# Patient Record
Sex: MALE | Race: WHITE | Employment: UNEMPLOYED | URBAN - METROPOLITAN AREA
[De-identification: names, ages, dates, MRNs, and addresses within clinical notes are randomized per-mention and may not be internally consistent; named-entity substitution may affect disease eponyms.]

---

## 2021-05-27 ENCOUNTER — HOSPITAL ENCOUNTER (EMERGENCY)
Age: 41
Discharge: HOME | End: 2021-05-27
Attending: EMERGENCY MEDICINE
Payer: COMMERCIAL

## 2021-05-27 ENCOUNTER — APPOINTMENT (OUTPATIENT)
Dept: GENERAL RADIOLOGY | Age: 41
End: 2021-05-27
Payer: COMMERCIAL

## 2021-05-27 VITALS
HEART RATE: 74 BPM | WEIGHT: 130.07 LBS | HEIGHT: 67 IN | SYSTOLIC BLOOD PRESSURE: 112 MMHG | TEMPERATURE: 98.4 F | BODY MASS INDEX: 20.42 KG/M2 | RESPIRATION RATE: 14 BRPM | DIASTOLIC BLOOD PRESSURE: 75 MMHG | OXYGEN SATURATION: 99 %

## 2021-05-27 DIAGNOSIS — S69.92XA HAND TRAUMA, LEFT, INITIAL ENCOUNTER: Primary | ICD-10-CM

## 2021-05-27 DIAGNOSIS — S62.339A CLOSED BOXER'S FRACTURE, INITIAL ENCOUNTER: ICD-10-CM

## 2021-05-27 PROCEDURE — 29125 APPL SHORT ARM SPLINT STATIC: CPT

## 2021-05-27 PROCEDURE — 99284 EMERGENCY DEPT VISIT MOD MDM: CPT

## 2021-05-27 PROCEDURE — 73130 X-RAY EXAM OF HAND: CPT

## 2021-05-27 PROCEDURE — 96372 THER/PROPH/DIAG INJ SC/IM: CPT

## 2021-05-27 PROCEDURE — 6360000002 HC RX W HCPCS: Performed by: EMERGENCY MEDICINE

## 2021-05-27 RX ORDER — DIPHENHYDRAMINE HYDROCHLORIDE 50 MG/ML
50 INJECTION INTRAMUSCULAR; INTRAVENOUS ONCE
Status: COMPLETED | OUTPATIENT
Start: 2021-05-27 | End: 2021-05-27

## 2021-05-27 RX ORDER — LORAZEPAM 2 MG/ML
2 INJECTION INTRAMUSCULAR ONCE
Status: COMPLETED | OUTPATIENT
Start: 2021-05-27 | End: 2021-05-27

## 2021-05-27 RX ORDER — HALOPERIDOL 5 MG/ML
5 INJECTION INTRAMUSCULAR ONCE
Status: COMPLETED | OUTPATIENT
Start: 2021-05-27 | End: 2021-05-27

## 2021-05-27 RX ADMIN — DIPHENHYDRAMINE HYDROCHLORIDE 50 MG: 50 INJECTION INTRAMUSCULAR; INTRAVENOUS at 16:47

## 2021-05-27 RX ADMIN — HALOPERIDOL LACTATE 5 MG: 5 INJECTION, SOLUTION INTRAMUSCULAR at 16:48

## 2021-05-27 RX ADMIN — LORAZEPAM 2 MG: 2 INJECTION, SOLUTION INTRAMUSCULAR; INTRAVENOUS at 16:29

## 2021-05-27 ASSESSMENT — PAIN SCALES - GENERAL: PAINLEVEL_OUTOF10: 3

## 2021-05-27 NOTE — ED PROVIDER NOTES
Emergency Department Encounter    Patient: Keyana Leon  MRN: [id number]  : 1980  Date of Evaluation: 2021  ED Provider:  Wood Phillips DO    Triage Chief Complaint:   Hand Injury (left hand pain, punched wall)      [place]:  Keyana Leon is a 47 y.o. male that presents to the emergency department for evaluation of left hand pain. Patient is currently at [facility], is upset about being there. Prior to arrival, he punched a wall. He now has pain in his left hand, over the pinky finger. Care provider at [facility] was concerned about a possible fracture, she was transported to our hospital for evaluation. Patient is right-hand dominant. He is able to move his left fingers and full range of motion. Denies numbness, tingling, paresthesias. Does have swelling. No deformities or open fractures. Denies pain or injury elsewhere. Denies additional precipitating, modifying, alleviating features. No history of bone effective tissue disorders. ROS - see HPI, below listed is current ROS at time of my eval:  General:  No fevers  Musculoskeletal: Left hand pain  Skin:  No rash, no pruritis, no easy bruising  Neurologic:   no extremity numbness, no extremity tingling, no extremity weakness  Extremities: No cyanosis    History reviewed. No pertinent past medical history. Past Surgical History:   Procedure Laterality Date    CHOLECYSTECTOMY         History reviewed. No pertinent family history.     Social History     Socioeconomic History    Marital status: Single     Spouse name: Not on file    Number of children: Not on file    Years of education: Not on file    Highest education level: Not on file   Occupational History    Not on file   Tobacco Use    Smoking status: Never Smoker    Smokeless tobacco: Never Used   Vaping Use    Vaping Use: Every day   Substance and Sexual Activity    Alcohol use: Never    Drug use: Yes     Types: Marijuana    Sexual activity: Not on file   Other Topics Concern    Not on file   Social History Narrative    Not on file     Social Determinants of Health     Financial Resource Strain:     Difficulty of Paying Living Expenses:    Food Insecurity:     Worried About Running Out of Food in the Last Year:     [address] in the Last Year:    Transportation Needs:     Lack of Transportation (Medical):  Lack of Transportation (Non-Medical):    Physical Activity:     Days of Exercise per Week:     Minutes of Exercise per Session:    Stress:     Feeling of Stress :    Social Connections:     Frequency of Communication with Friends and Family:     Frequency of Social Gatherings with Friends and Family:     Attends [employer] Services:     Active Member of Clubs or Organizations:     Attends Club or Organization Meetings:     Marital Status:    Intimate Partner Violence:     Fear of Current or Ex-Partner:     Emotionally Abused:     Physically Abused:     Sexually Abused:        No current facility-administered medications for this encounter. No current outpatient medications on file. No Known Allergies    Nursing Notes Reviewed    Physical Exam:  Triage VS:    ED Triage Vitals [05/27/21 1517]   Enc Vitals Group      /75      Pulse 74      Resp 14      Temp 98.4 °F (36.9 °C)      Temp Source Oral      SpO2 99 %       General appearance: Following commands and answering questions. GCS 15. Nontoxic in appearance. Skin:  Warm. Dry. Intact. No lacerations, abrasions, open fractures. Heart: Regular rate and rhythm. Audible S1 and S2. No audible murmurs, rubs, gallops. Lungs: Clear to auscultation bilaterally. Extremity: No clubbing or cyanosis. Examination is focused on the left upper extremity. No pain or injury over the shoulder, elbow, forearm, wrist.  There is pain at the dorsal aspect of the left hand, fifth metacarpal.  There is no deformity. No open fracture.  Patient is able to move fingers in full range of motion. There is no pain over the anatomic snuffbox. There is no pain over the scapholunate ligament. Neurological:  Alert and oriented times 3. Sensation is intact to light touch and two-point discrimination in the C5-T1 dermatomal distribution of bilateral upper extremities. 5/5 strength in muscles innervated by the radial, median, ulnar, AIN, PIN nerves. 2/4 biceps and triceps deep tendon reflexes. Perfusion:  2/4 radial and ulnar pulses in the upper extremities. Compartments are soft compressible. No signs of compartment syndrome. Capillary refill is brisk and less than 2 seconds. I have reviewed and interpreted all of the currently available diagnostic results from this visit       Radiographs:  Radiologist's Report Reviewed:  XR HAND LEFT (MIN 3 VIEWS)    Result Date: 5/27/2021  EXAMINATION: THREE XRAY VIEWS OF THE LEFT HAND 5/27/2021 3:32 pm COMPARISON: None. HISTORY: ORDERING SYSTEM PROVIDED HISTORY: pain, punches wall TECHNOLOGIST PROVIDED HISTORY: Reason for exam:->pain, punches wall Reason for Exam: pain, punches wall Acuity: Acute Type of Exam: Initial Mechanism of Injury: pain, punches wall Relevant Medical/Surgical History: na FINDINGS: Acute angulated fracture of the distal metacarpal.  No fracture elsewhere and no dislocation     Acute angulated/displaced fracture of the 5th metacarpal, boxer's fracture. MDM:  Patient was seen and evaluated in the emergency department by myself. A thorough history and physical exam were performed, prior medical records reviewed. Upon arrival, patient's vital signs were noted patient is hemodynamically stable. X-ray demonstrates acute angulated displaced fracture of the fifth metacarpal.  Results of radiographic data along differential diagnosis and treatment plan are discussed. Patient presents with left hand pain after punching a wall. Symptoms concerning for boxer's fracture. I recommend ulnar gutter splint.   Risk, benefits, terms discussed with the patient. All questions were answered. Patient is agreeable. Informed, verbal consent is obtained. Splint Application    Date/Time: 5/27/2021 4:10 PM  Performed by: Paige Graves DO  Authorized by: Paige Graves DO     Consent:     Consent obtained:  Verbal    Consent given by:  Patient    Risks discussed:  Discoloration, numbness and pain    Alternatives discussed:  No treatment and delayed treatment  Pre-procedure details:     Sensation:  Normal  Procedure details:     Laterality:  Left    Location:  Hand    Hand:  L hand    Splint type:  Ulnar gutter    Supplies:  Ortho-Glass  Post-procedure details:     Pain:  Unchanged    Patient tolerance of procedure: Tolerated well, no immediate complications    After splinting, reduction is also performed. Patient tolerated the procedure well with no complications. Repeat neurovascular exam remained stable. Compartments remain soft. Capillary refill remains brisk. Patient instructed to follow-up with primary care provider for reevaluation. Instructed to follow-up with orthopedics for reevaluation as well. Instructed to keep splint clean, dry, intact. No weightbearing. Instructed to rest, ice, compress, elevate the extremity. Instructed to return to the emergency department immediately with any new, worsening, concerning symptoms. Additional verbal and printed discharge instructions were provided. Patient verbalizes understanding and is agreeable discharge plan. Is discharged in stable condition      Clinical Impression:  1. Hand trauma, left, initial encounter    2. Closed, displaced, angulated left boxer's fracture, initial encounter        Disposition referral:  [facility] Emergency Department  [facility] [id number] [phone]  Go to   Immediately with any new, worsening, concerning symptoms.     Adry Prince, APRN - CNP  [address] [place] [id number] [phone]    In 3 days  Please follow-up with your primary care provider for reevaluation. Jasvir Barksdale MD  [address]  [phone]    In 3 days  Please follow-up with orthopedic surgery for reevaluation. No weightbearing on the left upper extremity. Keep splint clean, dry, intact. Please rest, ice, compress, elevate the extremity. You may require additional imaging or surgery      ED Provider Disposition:  DISPOSITION Decision To Discharge 05/27/2021 04:11:44 PM      Comment: Please note this report has been produced using speech recognition software and may contain errors related to that system including errors in grammar, punctuation, and spelling, as well as words and phrases that may be inappropriate. If there are any questions or concerns please feel free to contact the dictating provider for clarification.        [address] [facility],   05/27/21 9862

## 2024-05-14 ENCOUNTER — HOSPITAL ENCOUNTER (OUTPATIENT)
Age: 44
Setting detail: SPECIMEN
Discharge: HOME | End: 2024-05-14

## 2024-05-14 LAB
ALBUMIN SERPL-MCNC: 4.2 GM/DL (ref 3.4–5)
ALP BLD-CCNC: 75 IU/L (ref 40–128)
ALT SERPL-CCNC: 13 U/L (ref 10–40)
ANION GAP SERPL CALCULATED.3IONS-SCNC: 13 MMOL/L (ref 7–16)
AST SERPL-CCNC: 19 IU/L (ref 15–37)
BASOPHILS ABSOLUTE: 0.1 K/CU MM
BASOPHILS RELATIVE PERCENT: 0.9 % (ref 0–1)
BILIRUB SERPL-MCNC: 0.3 MG/DL (ref 0–1)
BUN SERPL-MCNC: 16 MG/DL (ref 6–23)
CALCIUM SERPL-MCNC: 8.7 MG/DL (ref 8.3–10.6)
CHLORIDE BLD-SCNC: 102 MMOL/L (ref 99–110)
CHOLEST SERPL-MCNC: 140 MG/DL
CO2: 25 MMOL/L (ref 21–32)
CREAT SERPL-MCNC: 1.1 MG/DL (ref 0.9–1.3)
DIFFERENTIAL TYPE: ABNORMAL
DOSE AMOUNT: NORMAL
DOSE TIME: NORMAL
EOSINOPHILS ABSOLUTE: 0.2 K/CU MM
EOSINOPHILS RELATIVE PERCENT: 2.4 % (ref 0–3)
GFR, ESTIMATED: 86 ML/MIN/1.73M2
GLUCOSE SERPL-MCNC: 68 MG/DL (ref 70–99)
HCT VFR BLD CALC: 38.1 % (ref 42–52)
HDLC SERPL-MCNC: 42 MG/DL
HEMOGLOBIN: 12.9 GM/DL (ref 13.5–18)
IMMATURE NEUTROPHIL %: 0.8 % (ref 0–0.43)
LDLC SERPL CALC-MCNC: 83 MG/DL
LYMPHOCYTES ABSOLUTE: 2 K/CU MM
LYMPHOCYTES RELATIVE PERCENT: 21.7 % (ref 24–44)
MCH RBC QN AUTO: 30.1 PG (ref 27–31)
MCHC RBC AUTO-ENTMCNC: 33.9 % (ref 32–36)
MCV RBC AUTO: 89 FL (ref 78–100)
MONOCYTES ABSOLUTE: 0.9 K/CU MM
MONOCYTES RELATIVE PERCENT: 10 % (ref 0–4)
NEUTROPHILS ABSOLUTE: 5.8 K/CU MM
NEUTROPHILS RELATIVE PERCENT: 64.2 % (ref 36–66)
NUCLEATED RBC %: 0 %
PDW BLD-RTO: 12.1 % (ref 11.7–14.9)
PLATELET # BLD: 258 K/CU MM (ref 140–440)
PMV BLD AUTO: 9.1 FL (ref 7.5–11.1)
POTASSIUM SERPL-SCNC: 4.7 MMOL/L (ref 3.5–5.1)
RBC # BLD: 4.28 M/CU MM (ref 4.6–6.2)
SODIUM BLD-SCNC: 140 MMOL/L (ref 135–145)
TOTAL IMMATURE NEUTOROPHIL: 0.07 K/CU MM
TOTAL NUCLEATED RBC: 0 K/CU MM
TOTAL PROTEIN: 6.8 GM/DL (ref 6.4–8.2)
TRIGL SERPL-MCNC: 73 MG/DL
TSH SERPL DL<=0.005 MIU/L-ACNC: 1.64 UIU/ML (ref 0.27–4.2)
VALPROIC ACID LEVEL: 57.2 UG/ML (ref 50–100)
WBC # BLD: 9 K/CU MM (ref 4–10.5)

## 2024-05-14 PROCEDURE — 36415 COLL VENOUS BLD VENIPUNCTURE: CPT

## 2024-05-14 PROCEDURE — 80053 COMPREHEN METABOLIC PANEL: CPT

## 2024-05-14 PROCEDURE — 80061 LIPID PANEL: CPT

## 2024-05-14 PROCEDURE — 84443 ASSAY THYROID STIM HORMONE: CPT

## 2024-05-14 PROCEDURE — 80164 ASSAY DIPROPYLACETIC ACD TOT: CPT

## 2024-05-14 PROCEDURE — 85025 COMPLETE CBC W/AUTO DIFF WBC: CPT

## 2024-05-20 ENCOUNTER — CLINICAL DOCUMENTATION (OUTPATIENT)
Dept: INTERNAL MEDICINE CLINIC | Age: 44
End: 2024-05-20